# Patient Record
Sex: FEMALE | URBAN - METROPOLITAN AREA
[De-identification: names, ages, dates, MRNs, and addresses within clinical notes are randomized per-mention and may not be internally consistent; named-entity substitution may affect disease eponyms.]

---

## 2023-01-06 ENCOUNTER — APPOINTMENT (RX ONLY)
Dept: URBAN - METROPOLITAN AREA CLINIC 88 | Facility: CLINIC | Age: 58
Setting detail: DERMATOLOGY
End: 2023-01-06

## 2023-01-06 DIAGNOSIS — L81.4 OTHER MELANIN HYPERPIGMENTATION: ICD-10-CM

## 2023-01-06 PROCEDURE — ? COUNSELING

## 2023-01-06 PROCEDURE — ? COSMETIC CONSULTATION: BROWN SPOTS

## 2023-01-06 PROCEDURE — ? INVENTORY

## 2023-01-06 PROCEDURE — ? ADDITIONAL NOTES

## 2023-01-06 NOTE — PROCEDURE: ADDITIONAL NOTES
Detail Level: Simple
Render Risk Assessment In Note?: no
Additional Notes: Date: 1/6/22\\nTreatment plan : Recommend Landen laser for legs and would cost $500 per session and do two to three session \\nAlso recommend chemical peel once spots get lighter which is $150 per session \\nAnd arms for $500 per session for arms 1 to two sessions\\n\\nQuote :$500 per session \\nPaid today :$100\\nBalance owe :400

## 2023-02-17 ENCOUNTER — APPOINTMENT (RX ONLY)
Dept: URBAN - METROPOLITAN AREA CLINIC 88 | Facility: CLINIC | Age: 58
Setting detail: DERMATOLOGY
End: 2023-02-17

## 2023-02-17 DIAGNOSIS — L81.4 OTHER MELANIN HYPERPIGMENTATION: ICD-10-CM

## 2023-02-17 PROCEDURE — ? ADDITIONAL NOTES

## 2023-02-17 PROCEDURE — ? ALEXANDRITE LASER

## 2023-02-17 PROCEDURE — ? INVENTORY

## 2023-02-17 PROCEDURE — ? COUNSELING

## 2023-02-17 ASSESSMENT — LOCATION DETAILED DESCRIPTION DERM
LOCATION DETAILED: RIGHT ANTERIOR DISTAL THIGH
LOCATION DETAILED: LEFT ANTERIOR DISTAL THIGH
LOCATION DETAILED: RIGHT DISTAL PRETIBIAL REGION
LOCATION DETAILED: LEFT PROXIMAL DORSAL FOREARM
LOCATION DETAILED: RIGHT PROXIMAL RADIAL DORSAL FOREARM
LOCATION DETAILED: LEFT PROXIMAL POSTERIOR UPPER ARM
LOCATION DETAILED: LEFT DISTAL PRETIBIAL REGION
LOCATION DETAILED: RIGHT PROXIMAL POSTERIOR UPPER ARM

## 2023-02-17 ASSESSMENT — LOCATION SIMPLE DESCRIPTION DERM
LOCATION SIMPLE: RIGHT POSTERIOR UPPER ARM
LOCATION SIMPLE: LEFT FOREARM
LOCATION SIMPLE: RIGHT PRETIBIAL REGION
LOCATION SIMPLE: LEFT POSTERIOR UPPER ARM
LOCATION SIMPLE: LEFT PRETIBIAL REGION
LOCATION SIMPLE: RIGHT THIGH
LOCATION SIMPLE: LEFT THIGH
LOCATION SIMPLE: RIGHT FOREARM

## 2023-02-17 ASSESSMENT — LOCATION ZONE DERM
LOCATION ZONE: ARM
LOCATION ZONE: LEG

## 2023-02-17 NOTE — PROCEDURE: ADDITIONAL NOTES
Additional Notes: Quoted patient $400 per session for chemical peel and $500 per session for Landen laser treatment. Niraj reports previously responding well to laser treatment on arms for brown spots several years ago and states her spots were resolved with one treatment. Patient requested to treat legs today and she will come back at a later date to treat arms. Advised patient legs are slower to heal
Detail Level: Simple
Render Risk Assessment In Note?: no

## 2023-02-17 NOTE — PROCEDURE: ALEXANDRITE LASER
Post-Procedure Text: Vaseline and ice applied. Post care reviewed with patient.
Anesthesia Type: 1% lidocaine with epinephrine
Cryo Settings: 20
External Cooling Fan Speed: 0
Fluence: 35
Consent: Written consent obtained, risks reviewed including but not limited to crusting, scabbing, blistering, scarring, darker or lighter pigmentary change, paradoxical hair regrowth, incomplete removal of hair and infection.
Laser Type: Alexandrite 755nm
Detail Level: Detailed
Post-Care Instructions: I reviewed with the patient in detail post-care instructions. Patient should avoid sun for a minimum of 4 weeks before and after treatment.
Location: right and left legs

## 2023-03-31 ENCOUNTER — APPOINTMENT (RX ONLY)
Dept: URBAN - METROPOLITAN AREA CLINIC 88 | Facility: CLINIC | Age: 58
Setting detail: DERMATOLOGY
End: 2023-03-31

## 2023-03-31 DIAGNOSIS — L81.4 OTHER MELANIN HYPERPIGMENTATION: ICD-10-CM

## 2023-03-31 PROCEDURE — ? INVENTORY

## 2023-03-31 PROCEDURE — ? ALEXANDRITE LASER

## 2023-03-31 PROCEDURE — ? DEFER

## 2023-03-31 PROCEDURE — ? ADDITIONAL NOTES

## 2023-03-31 ASSESSMENT — LOCATION DETAILED DESCRIPTION DERM
LOCATION DETAILED: LEFT PROXIMAL PRETIBIAL REGION
LOCATION DETAILED: RIGHT PROXIMAL PRETIBIAL REGION

## 2023-03-31 ASSESSMENT — LOCATION SIMPLE DESCRIPTION DERM
LOCATION SIMPLE: LEFT PRETIBIAL REGION
LOCATION SIMPLE: RIGHT PRETIBIAL REGION

## 2023-03-31 ASSESSMENT — LOCATION ZONE DERM: LOCATION ZONE: LEG

## 2023-03-31 NOTE — PROCEDURE: ADDITIONAL NOTES
Additional Notes: Patient reports she had little improvement in solar lentigines after her last CLIFTON treatment. Discussed with patient that we are limited in the fluence we may use on the legs due to potential of scarring as legs heal significantly slower due to lack of circulation. Advised patient to repeat CLIFTON treatment today at a slightly higher fluence for darker spots, then use chemical peel to lighten lighter spots. Quoted patient $400 per session for chemical peel.
Detail Level: Simple
Render Risk Assessment In Note?: no

## 2023-03-31 NOTE — PROCEDURE: ALEXANDRITE LASER
Post-Procedure Text: Vaseline and ice applied. Post care reviewed with patient.
Pulse Duration: 3 ms
Anesthesia Type: 1% lidocaine with epinephrine
Cryo Settings: 20
External Cooling Fan Speed: 0
Fluence: 40
Consent: Verbal consent obtained, risks reviewed including but not limited to crusting, scabbing, blistering, scarring, darker or lighter pigmentary change, paradoxical hair regrowth, incomplete removal of hair and infection.
Total Pulses: 958
Laser Type: Alexandrite 755nm
Detail Level: Zone
Post-Care Instructions: I reviewed with the patient in detail post-care instructions. Patient should avoid sun for a minimum of 4 weeks before and after treatment.
Location: right and left legs

## 2023-03-31 NOTE — PROCEDURE: DEFER
X Size Of Lesion In Cm (Optional): 0
Procedure To Be Performed At Next Visit: Chemical Peel TCA
Detail Level: Detailed
Introduction Text (Please End With A Colon): The following procedure was deferred: